# Patient Record
Sex: FEMALE | Race: WHITE | NOT HISPANIC OR LATINO | URBAN - METROPOLITAN AREA
[De-identification: names, ages, dates, MRNs, and addresses within clinical notes are randomized per-mention and may not be internally consistent; named-entity substitution may affect disease eponyms.]

---

## 2017-07-09 ENCOUNTER — EMERGENCY (EMERGENCY)
Facility: HOSPITAL | Age: 37
LOS: 0 days | Discharge: HOME | End: 2017-07-09

## 2017-07-09 DIAGNOSIS — M79.642 PAIN IN LEFT HAND: ICD-10-CM

## 2017-07-09 DIAGNOSIS — I10 ESSENTIAL (PRIMARY) HYPERTENSION: ICD-10-CM
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
## 2017-08-22 ENCOUNTER — EMERGENCY (EMERGENCY)
Facility: HOSPITAL | Age: 37
LOS: 0 days | Discharge: HOME | End: 2017-08-23

## 2017-08-22 DIAGNOSIS — R20.2 PARESTHESIA OF SKIN: ICD-10-CM

## 2017-08-22 DIAGNOSIS — I10 ESSENTIAL (PRIMARY) HYPERTENSION: ICD-10-CM

## 2017-08-22 DIAGNOSIS — Z79.899 OTHER LONG TERM (CURRENT) DRUG THERAPY: ICD-10-CM

## 2019-02-01 ENCOUNTER — EMERGENCY (EMERGENCY)
Facility: HOSPITAL | Age: 39
LOS: 0 days | Discharge: HOME | End: 2019-02-01
Attending: EMERGENCY MEDICINE | Admitting: EMERGENCY MEDICINE

## 2019-02-01 VITALS — WEIGHT: 190.04 LBS | HEIGHT: 64 IN

## 2019-02-01 VITALS
SYSTOLIC BLOOD PRESSURE: 170 MMHG | TEMPERATURE: 95 F | DIASTOLIC BLOOD PRESSURE: 77 MMHG | OXYGEN SATURATION: 98 % | HEART RATE: 107 BPM | RESPIRATION RATE: 18 BRPM

## 2019-02-01 DIAGNOSIS — Z3A.09 9 WEEKS GESTATION OF PREGNANCY: ICD-10-CM

## 2019-02-01 DIAGNOSIS — O21.9 VOMITING OF PREGNANCY, UNSPECIFIED: ICD-10-CM

## 2019-02-01 DIAGNOSIS — R19.7 DIARRHEA, UNSPECIFIED: ICD-10-CM

## 2019-02-01 LAB
ALBUMIN SERPL ELPH-MCNC: 4.3 G/DL — SIGNIFICANT CHANGE UP (ref 3.5–5.2)
ALP SERPL-CCNC: 64 U/L — SIGNIFICANT CHANGE UP (ref 30–115)
ALT FLD-CCNC: 21 U/L — SIGNIFICANT CHANGE UP (ref 0–41)
ANION GAP SERPL CALC-SCNC: 18 MMOL/L — HIGH (ref 7–14)
APPEARANCE UR: ABNORMAL
AST SERPL-CCNC: 24 U/L — SIGNIFICANT CHANGE UP (ref 0–41)
BASOPHILS # BLD AUTO: 0.02 K/UL — SIGNIFICANT CHANGE UP (ref 0–0.2)
BASOPHILS NFR BLD AUTO: 0.2 % — SIGNIFICANT CHANGE UP (ref 0–1)
BILIRUB SERPL-MCNC: 0.5 MG/DL — SIGNIFICANT CHANGE UP (ref 0.2–1.2)
BILIRUB UR-MCNC: NEGATIVE — SIGNIFICANT CHANGE UP
BUN SERPL-MCNC: 10 MG/DL — SIGNIFICANT CHANGE UP (ref 10–20)
CALCIUM SERPL-MCNC: 8.9 MG/DL — SIGNIFICANT CHANGE UP (ref 8.5–10.1)
CHLORIDE SERPL-SCNC: 97 MMOL/L — LOW (ref 98–110)
CO2 SERPL-SCNC: 21 MMOL/L — SIGNIFICANT CHANGE UP (ref 17–32)
COLOR SPEC: YELLOW — SIGNIFICANT CHANGE UP
CREAT SERPL-MCNC: 0.5 MG/DL — LOW (ref 0.7–1.5)
DIFF PNL FLD: NEGATIVE — SIGNIFICANT CHANGE UP
EOSINOPHIL # BLD AUTO: 0.02 K/UL — SIGNIFICANT CHANGE UP (ref 0–0.7)
EOSINOPHIL NFR BLD AUTO: 0.2 % — SIGNIFICANT CHANGE UP (ref 0–8)
GLUCOSE SERPL-MCNC: 108 MG/DL — HIGH (ref 70–99)
GLUCOSE UR QL: NEGATIVE — SIGNIFICANT CHANGE UP
HCG SERPL-ACNC: HIGH MIU/ML
HCT VFR BLD CALC: 42.2 % — SIGNIFICANT CHANGE UP (ref 37–47)
HGB BLD-MCNC: 13.8 G/DL — SIGNIFICANT CHANGE UP (ref 12–16)
IMM GRANULOCYTES NFR BLD AUTO: 0.6 % — HIGH (ref 0.1–0.3)
KETONES UR-MCNC: NEGATIVE — SIGNIFICANT CHANGE UP
LACTATE SERPL-SCNC: 1.2 MMOL/L — SIGNIFICANT CHANGE UP (ref 0.5–2.2)
LEUKOCYTE ESTERASE UR-ACNC: ABNORMAL
LIDOCAIN IGE QN: 96 U/L — HIGH (ref 7–60)
LYMPHOCYTES # BLD AUTO: 0.42 K/UL — LOW (ref 1.2–3.4)
LYMPHOCYTES # BLD AUTO: 3.2 % — LOW (ref 20.5–51.1)
MCHC RBC-ENTMCNC: 27.1 PG — SIGNIFICANT CHANGE UP (ref 27–31)
MCHC RBC-ENTMCNC: 32.7 G/DL — SIGNIFICANT CHANGE UP (ref 32–37)
MCV RBC AUTO: 82.9 FL — SIGNIFICANT CHANGE UP (ref 81–99)
MONOCYTES # BLD AUTO: 0.48 K/UL — SIGNIFICANT CHANGE UP (ref 0.1–0.6)
MONOCYTES NFR BLD AUTO: 3.7 % — SIGNIFICANT CHANGE UP (ref 1.7–9.3)
NEUTROPHILS # BLD AUTO: 12.01 K/UL — HIGH (ref 1.4–6.5)
NEUTROPHILS NFR BLD AUTO: 92.1 % — HIGH (ref 42.2–75.2)
NITRITE UR-MCNC: NEGATIVE — SIGNIFICANT CHANGE UP
PH UR: 6 — SIGNIFICANT CHANGE UP (ref 5–8)
PLATELET # BLD AUTO: 232 K/UL — SIGNIFICANT CHANGE UP (ref 130–400)
POTASSIUM SERPL-MCNC: 4.6 MMOL/L — SIGNIFICANT CHANGE UP (ref 3.5–5)
POTASSIUM SERPL-SCNC: 4.6 MMOL/L — SIGNIFICANT CHANGE UP (ref 3.5–5)
PROT SERPL-MCNC: 7.3 G/DL — SIGNIFICANT CHANGE UP (ref 6–8)
PROT UR-MCNC: 30
RBC # BLD: 5.09 M/UL — SIGNIFICANT CHANGE UP (ref 4.2–5.4)
RBC # FLD: 13.7 % — SIGNIFICANT CHANGE UP (ref 11.5–14.5)
SODIUM SERPL-SCNC: 136 MMOL/L — SIGNIFICANT CHANGE UP (ref 135–146)
SP GR SPEC: >=1.03 — SIGNIFICANT CHANGE UP (ref 1.01–1.03)
UROBILINOGEN FLD QL: 1 (ref 0.2–0.2)
WBC # BLD: 13.03 K/UL — HIGH (ref 4.8–10.8)
WBC # FLD AUTO: 13.03 K/UL — HIGH (ref 4.8–10.8)

## 2019-02-01 RX ORDER — SODIUM CHLORIDE 9 MG/ML
1000 INJECTION, SOLUTION INTRAVENOUS ONCE
Qty: 0 | Refills: 0 | Status: COMPLETED | OUTPATIENT
Start: 2019-02-01 | End: 2019-02-01

## 2019-02-01 RX ORDER — CEPHALEXIN 500 MG
1 CAPSULE ORAL
Qty: 10 | Refills: 0 | OUTPATIENT
Start: 2019-02-01 | End: 2019-02-05

## 2019-02-01 RX ADMIN — SODIUM CHLORIDE 2000 MILLILITER(S): 9 INJECTION, SOLUTION INTRAVENOUS at 11:53

## 2019-02-01 RX ADMIN — SODIUM CHLORIDE 1000 MILLILITER(S): 9 INJECTION, SOLUTION INTRAVENOUS at 11:49

## 2019-02-01 RX ADMIN — SODIUM CHLORIDE 1000 MILLILITER(S): 9 INJECTION, SOLUTION INTRAVENOUS at 10:20

## 2019-02-01 NOTE — ED PROVIDER NOTE - CARE PLAN
Principal Discharge DX:	Nausea, vomiting, and diarrhea  Secondary Diagnosis:	First trimester pregnancy

## 2019-02-01 NOTE — ED PROVIDER NOTE - MEDICAL DECISION MAKING DETAILS
Sx improved with GI cocktail pt tolerating PO fluids in the ED and feeling much better, wants to go home. Has prompt GYN f/u, advised return for recurrent or worsening sx. Sx improved with GI cocktail. Labs with  mild leukocytosis likely due to vomiting, UA with bacteria and ketones. Lipase 96 but no abd pain so doubt truly represents pancreatitis. pt tolerating PO fluids in the ED and feeling much better, wants to go home. Has prompt GYN f/u, advised return for recurrent or worsening sx. Will treat bacteriuria and dc.

## 2019-02-01 NOTE — ED ADULT NURSE NOTE - NSIMPLEMENTINTERV_GEN_ALL_ED
Implemented All Universal Safety Interventions:  [place] to call system. Call bell, personal items and telephone within reach. Instruct patient to call for assistance. Room bathroom lighting operational. Non-slip footwear when patient is off stretcher. Physically safe environment: no spills, clutter or unnecessary equipment. Stretcher in lowest position, wheels locked, appropriate side rails in place.

## 2019-02-01 NOTE — ED PROVIDER NOTE - ATTENDING CONTRIBUTION TO CARE
40yo woman 9weeks pregnant by dates c/o Nausea, vomiting, diarrhea x 1 day without associated fever, chills, abdominal pain, pelvic pain, vaginal discharge or bleeding. No recent travel, sick contacts or obvious food exposure. No prenatal care yet, first visit coming up in 3 days. VS, exam as noted, pt nontoxic appearing but uncomfortable due to vomiting, however abd soft, NT, and FHR by sono is 185. Labs, IVF, symptomatic therapy for likely viral gastroenteritis, reassess.

## 2019-02-01 NOTE — ED PROVIDER NOTE - NSFOLLOWUPINSTRUCTIONS_ED_ALL_ED_FT
Please follow up with your primary care doctor in 1-2 days, and follow up with you OB as scheduled on Monday as discussed.     Nausea / Vomiting    Nausea is the feeling that you have to vomit. As nausea gets worse, it can lead to vomiting. Vomiting puts you at an increased risk for dehydration. Older adults and people with other diseases or a weak immune system are at higher risk for dehydration. Drink clear fluids in small but frequent amounts as tolerated. Eat bland, easy-to-digest foods in small amounts as tolerated.    SEEK IMMEDIATE MEDICAL CARE IF YOU HAVE ANY OF THE FOLLOWING SYMPTOMS: fever, inability to keep sufficient fluids down, black or bloody vomitus, black or bloody stools, lightheadedness/dizziness, chest pain, severe headache, rash, shortness of breath, cold or clammy skin, confusion, pain with urination, or any signs of dehydration.     Diarrhea    Diarrhea is frequent loose or watery bowel movements that has many causes. Diarrhea can make you feel weak and cause you to become dehydrated. Diarrhea typically lasts 2–3 days, but can last longer if it is a sign of something more serious. Drink clear fluids to prevent dehydration. Eat bland, easy-to-digest foods as tolerated.     SEEK IMMEDIATE MEDICAL CARE IF YOU HAVE ANY OF THE FOLLOWING SYMPTOMS: high fevers, lightheadedness/dizziness, chest pain, black or bloody stools, shortness of breath, severe abdominal or back pain, or any signs of dehydration.

## 2019-02-01 NOTE — ED ADULT NURSE NOTE - OBJECTIVE STATEMENT
pt came to the ER today with c/o nausea and vomiting with diarrhea since last night, pt states she is 9 weeks pregnant.

## 2019-02-01 NOTE — ED PROVIDER NOTE - OBJECTIVE STATEMENT
38 yo F, 9 weeks pregnant, , presents with NVD. Started last night. Patient is a health care worker. >10 episodes of vomiting NBNB and diarrhea, watery. Denies abd pain, vaginal discharge, fevers, chills, pelvic pain, CP, SOB, lightheaded, dizziness.

## 2019-02-01 NOTE — ED PROVIDER NOTE - NS ED ROS FT
Constitutional: See HPI.  Eyes: No visual changes, eye pain or discharge. No Photophobia  ENMT: No neck pain or stiffness. No limited ROM  Cardiac: No SOB or edema. No chest pain with exertion.  Respiratory: No cough or respiratory distress. No hemoptysis.   GI: see hpi  : No dysuria, frequency or burning. No Discharge  MS: No myalgia, muscle weakness, joint pain or back pain.  Neuro: No headache or weakness. No LOC.  Skin: No skin rash.  Except as documented in the HPI, all other systems are negative.

## 2019-02-01 NOTE — ED PROVIDER NOTE - PHYSICAL EXAMINATION
AOx4, Non toxic appearing, NAD, speaking in full sentences.   Skin - warm and dry, no acute rash.   Head - normocephalic, atraumatic.   Eyes - PERRLA/EOMI, conjunctiva and sclera clear.   ENT- MM moist, no nasal discharge.  Pharynx unremarkable.    Neck - supple nt, no meningeal signs.   Heart - RRR s1s2 nl, no rub/murmur.   Lungs- No retractions, BS equal, CTAB.   Abdomen - soft ntnd no r/g.   Extremities- moves all, +equal distal pulses, brisk cap refill, sensation wnl, normal ROM. No LE edema, calves nttp b/l.

## 2019-02-02 LAB
CULTURE RESULTS: SIGNIFICANT CHANGE UP
SPECIMEN SOURCE: SIGNIFICANT CHANGE UP

## 2019-02-27 ENCOUNTER — EMERGENCY (EMERGENCY)
Facility: HOSPITAL | Age: 39
LOS: 0 days | Discharge: HOME | End: 2019-02-27
Attending: EMERGENCY MEDICINE | Admitting: EMERGENCY MEDICINE

## 2019-02-27 VITALS
OXYGEN SATURATION: 98 % | RESPIRATION RATE: 18 BRPM | HEART RATE: 110 BPM | TEMPERATURE: 96 F | SYSTOLIC BLOOD PRESSURE: 168 MMHG | DIASTOLIC BLOOD PRESSURE: 85 MMHG

## 2019-02-27 VITALS
OXYGEN SATURATION: 99 % | HEART RATE: 89 BPM | RESPIRATION RATE: 18 BRPM | DIASTOLIC BLOOD PRESSURE: 68 MMHG | SYSTOLIC BLOOD PRESSURE: 142 MMHG

## 2019-02-27 DIAGNOSIS — Z3A.12 12 WEEKS GESTATION OF PREGNANCY: ICD-10-CM

## 2019-02-27 DIAGNOSIS — R10.2 PELVIC AND PERINEAL PAIN: ICD-10-CM

## 2019-02-27 DIAGNOSIS — O20.8 OTHER HEMORRHAGE IN EARLY PREGNANCY: ICD-10-CM

## 2019-02-27 DIAGNOSIS — Z79.2 LONG TERM (CURRENT) USE OF ANTIBIOTICS: ICD-10-CM

## 2019-02-27 DIAGNOSIS — O20.0 THREATENED ABORTION: ICD-10-CM

## 2019-02-27 DIAGNOSIS — O09.511 SUPERVISION OF ELDERLY PRIMIGRAVIDA, FIRST TRIMESTER: ICD-10-CM

## 2019-02-27 LAB
ALBUMIN SERPL ELPH-MCNC: 4 G/DL — SIGNIFICANT CHANGE UP (ref 3.5–5.2)
ALP SERPL-CCNC: 61 U/L — SIGNIFICANT CHANGE UP (ref 30–115)
ALT FLD-CCNC: 13 U/L — SIGNIFICANT CHANGE UP (ref 0–41)
ANION GAP SERPL CALC-SCNC: 13 MMOL/L — SIGNIFICANT CHANGE UP (ref 7–14)
APPEARANCE UR: CLEAR — SIGNIFICANT CHANGE UP
AST SERPL-CCNC: 15 U/L — SIGNIFICANT CHANGE UP (ref 0–41)
BACTERIA # UR AUTO: ABNORMAL /HPF
BILIRUB SERPL-MCNC: <0.2 MG/DL — SIGNIFICANT CHANGE UP (ref 0.2–1.2)
BILIRUB UR-MCNC: NEGATIVE — SIGNIFICANT CHANGE UP
BLD GP AB SCN SERPL QL: SIGNIFICANT CHANGE UP
BUN SERPL-MCNC: 12 MG/DL — SIGNIFICANT CHANGE UP (ref 10–20)
CALCIUM SERPL-MCNC: 9.8 MG/DL — SIGNIFICANT CHANGE UP (ref 8.5–10.1)
CHLORIDE SERPL-SCNC: 104 MMOL/L — SIGNIFICANT CHANGE UP (ref 98–110)
CO2 SERPL-SCNC: 21 MMOL/L — SIGNIFICANT CHANGE UP (ref 17–32)
COLOR SPEC: YELLOW — SIGNIFICANT CHANGE UP
CREAT SERPL-MCNC: 0.5 MG/DL — LOW (ref 0.7–1.5)
DIFF PNL FLD: ABNORMAL
GLUCOSE SERPL-MCNC: 95 MG/DL — SIGNIFICANT CHANGE UP (ref 70–99)
GLUCOSE UR QL: NEGATIVE MG/DL — SIGNIFICANT CHANGE UP
HCG SERPL-ACNC: HIGH MIU/ML
HCT VFR BLD CALC: 39.1 % — SIGNIFICANT CHANGE UP (ref 37–47)
HGB BLD-MCNC: 13 G/DL — SIGNIFICANT CHANGE UP (ref 12–16)
KETONES UR-MCNC: NEGATIVE — SIGNIFICANT CHANGE UP
LEUKOCYTE ESTERASE UR-ACNC: NEGATIVE — SIGNIFICANT CHANGE UP
MCHC RBC-ENTMCNC: 28.1 PG — SIGNIFICANT CHANGE UP (ref 27–31)
MCHC RBC-ENTMCNC: 33.2 G/DL — SIGNIFICANT CHANGE UP (ref 32–37)
MCV RBC AUTO: 84.4 FL — SIGNIFICANT CHANGE UP (ref 81–99)
NITRITE UR-MCNC: NEGATIVE — SIGNIFICANT CHANGE UP
NRBC # BLD: 0 /100 WBCS — SIGNIFICANT CHANGE UP (ref 0–0)
PH UR: 6 — SIGNIFICANT CHANGE UP (ref 5–8)
PLATELET # BLD AUTO: 259 K/UL — SIGNIFICANT CHANGE UP (ref 130–400)
POTASSIUM SERPL-MCNC: 4.2 MMOL/L — SIGNIFICANT CHANGE UP (ref 3.5–5)
POTASSIUM SERPL-SCNC: 4.2 MMOL/L — SIGNIFICANT CHANGE UP (ref 3.5–5)
PROT SERPL-MCNC: 7 G/DL — SIGNIFICANT CHANGE UP (ref 6–8)
PROT UR-MCNC: NEGATIVE MG/DL — SIGNIFICANT CHANGE UP
RBC # BLD: 4.63 M/UL — SIGNIFICANT CHANGE UP (ref 4.2–5.4)
RBC # FLD: 13.5 % — SIGNIFICANT CHANGE UP (ref 11.5–14.5)
SODIUM SERPL-SCNC: 138 MMOL/L — SIGNIFICANT CHANGE UP (ref 135–146)
SP GR SPEC: 1.02 — SIGNIFICANT CHANGE UP (ref 1.01–1.03)
TYPE + AB SCN PNL BLD: SIGNIFICANT CHANGE UP
UROBILINOGEN FLD QL: 0.2 MG/DL — SIGNIFICANT CHANGE UP (ref 0.2–0.2)
WBC # BLD: 11.12 K/UL — HIGH (ref 4.8–10.8)
WBC # FLD AUTO: 11.12 K/UL — HIGH (ref 4.8–10.8)
WBC UR QL: SIGNIFICANT CHANGE UP /HPF

## 2019-02-27 NOTE — ED PROVIDER NOTE - PLAN OF CARE
39f w pregnant w vaginal bleeding. nontoxic appearing, n/v intact. no abd tender. --CBC, CMP, UA, US pelvis, T&S, observe/re-assess, Gyn as needed 39f w pregnant w vaginal spotting. nontoxic appearing, n/v intact. no abd tender. --CBC, CMP, UA, US pelvis, T&S, observe/re-assess, Gyn as needed

## 2019-02-27 NOTE — ED PROVIDER NOTE - PHYSICAL EXAMINATION
Physical Exam  General: Awake, alert, NAD, WDWN, non-toxic appearing, NCAT  Eyes: PERRL, EOMI, no icterus, lids and conjunctivae are normal  ENT: External inspection normal, pink/moist membranes, pharynx normal  CV: S1S2, regular rate and rhythm, no murmur/gallops/rubs, no JVD, 2+ pulses b/l, no edema/cords/homans, warm/well-perfused  Respiratory: Normal respiratory rate/effort, no respiratory distress, normal voice, speaking full sentences, lungs clear to auscultation b/l, no wheezing/rales/rhonchi, no retractions, no stridor  Abdomen: Soft abdomen, no tender/distended/guarding/rebound, no CVA tender  Musculoskeletal: FROM all 4 extremities, N/V intact  Neck: FROM neck, supple, no meningismus, trachea midline, no JVD  Integumentary: Color normal for race, warm and dry, no rash  Neuro: Oriented x3, CN 2-12 grossly intact, normal motor, normal sensory  Psych: Oriented x3, mood normal, affect normal

## 2019-02-27 NOTE — ED PROVIDER NOTE - NSFOLLOWUPCLINICS_GEN_ALL_ED_FT
An OB/GYN physician  Obstetrics & Gynecology  .  [place]   Phone:   Fax:   Follow Up Time: 1-3 Days    [facility]  OB/GYN  [address] [place] [address]  Phone: [phone]  Fax:   Follow Up Time: 1-3 Days

## 2019-02-27 NOTE — ED ADULT NURSE NOTE - OBJECTIVE STATEMENT
patient reports to the ED with a complaint of light vaginal spotting that began 4 hours prior to arrival. patient states she is 12 weeks pregnant and has a history of a prior miscarriage

## 2019-02-27 NOTE — ED PROVIDER NOTE - CLINICAL SUMMARY MEDICAL DECISION MAKING FREE TEXT BOX
39f w pregnant w vaginal spotting. nontoxic appearing, n/v intact. no abd tender. Labs & imaging reviewed. Live IUP noted. Hb & Type ok. Pt advised regarding symptomatic/supportive care, importance of OB f/u, and symptoms to prompt ED return. Copy of results given to patient.

## 2019-02-27 NOTE — ED PROVIDER NOTE - CARE PLAN
Principal Discharge DX:	Vaginal bleeding during pregnancy  Secondary Diagnosis:	Threatened miscarriage Principal Discharge DX:	Vaginal bleeding during pregnancy  Assessment and plan of treatment:	39f w pregnant w vaginal bleeding. nontoxic appearing, n/v intact. no abd tender. --CBC, CMP, UA, US pelvis, T&S, observe/re-assess, Gyn as needed  Secondary Diagnosis:	Threatened miscarriage Principal Discharge DX:	Vaginal bleeding during pregnancy  Assessment and plan of treatment:	39f w pregnant w vaginal spotting. nontoxic appearing, n/v intact. no abd tender. --CBC, CMP, UA, US pelvis, T&S, observe/re-assess, Gyn as needed  Secondary Diagnosis:	Threatened miscarriage

## 2019-02-27 NOTE — ED PROVIDER NOTE - NSFOLLOWUPINSTRUCTIONS_ED_ALL_ED_FT
Threatened Miscarriage  A threatened miscarriage occurs when a woman has vaginal bleeding during the first 20 weeks of pregnancy but the pregnancy has not ended. If you have vaginal bleeding during this time, your health care provider will do tests to make sure you are still pregnant. If the tests show that you are still pregnant and that the developing baby (fetus) inside your uterus is still growing, your condition is considered a threatened miscarriage.    ImageA threatened miscarriage does not mean your pregnancy will end, but it does increase the risk of losing your pregnancy (complete miscarriage).    What are the causes?  The cause of this condition is usually not known. For women who go on to have a complete miscarriage, the most common cause is an abnormal number of chromosomes in the developing baby. Chromosomes are the structures inside cells that hold all of a person's genetic material.    What increases the risk?  The following lifestyle factors may increase your risk of a miscarriage in early pregnancy:    Smoking.  Drinking excessive amounts of alcohol or caffeine.  Recreational drug use.    The following preexisting health conditions may increase your risk of a miscarriage in early pregnancy:    Polycystic ovary syndrome.  Uterine fibroids.  Infections.  Diabetes mellitus.    What are the signs or symptoms?  Symptoms of this condition include:    Vaginal bleeding.  Mild abdominal pain or cramps.    How is this diagnosed?  If you have bleeding with or without abdominal pain before 20 weeks of pregnancy, your health care provider will do tests to check whether you are still pregnant. These will include:    Ultrasound. This test uses sound waves to create images of the inside of your uterus. This allows your health care provider to look at your developing baby and other structures, such as your placenta.  Pelvic exam. This is an internal exam of your vagina and cervix.  Measurement of your baby's heart rate.  Laboratory tests such as blood tests, urine tests, or swabs for infection    You may be diagnosed with a threatened miscarriage if:    Ultrasound testing shows that you are still pregnant.  Your baby’s heart rate is strong.  A pelvic exam shows that the opening between your uterus and your vagina (cervix) is closed.  Blood tests confirm that you are still pregnant.    How is this treated?  No treatments have been shown to prevent a threatened miscarriage from going on to a complete miscarriage. However, the right home care is important.    Follow these instructions at home:  Get plenty of rest.  Do not have sex or use tampons if you have vaginal bleeding.  Do not douche.  Do not smoke or use recreational drugs.  Do not drink alcohol.  Avoid caffeine.  Keep all follow-up prenatal visits as told by your health care provider. This is important.  Contact a health care provider if:  You have light vaginal bleeding or spotting while pregnant.  You have abdominal pain or cramping.  You have a fever.  Get help right away if:  You have heavy vaginal bleeding.  You have blood clots coming from your vagina.  You pass tissue from your vagina.  You leak fluid, or you have a gush of fluid from your vagina.  You have severe low back pain or abdominal cramps.  You have fever, chills, and severe abdominal pain.  Summary  A threatened miscarriage occurs when a woman has vaginal bleeding during the first 20 weeks of pregnancy but the pregnancy has not ended.  The cause of a threatened miscarriage is usually not known.  Symptoms of this condition may include vaginal bleeding and mild abdominal pain or cramps.  No treatments have been shown to prevent a threatened miscarriage from going on to a complete miscarriage.  Keep all follow-up prenatal visits as told by your health care provider. This is important.  This information is not intended to replace advice given to you by your health care provider. Make sure you discuss any questions you have with your health care provider.

## 2019-02-27 NOTE — ED ADULT NURSE NOTE - NSIMPLEMENTINTERV_GEN_ALL_ED
Implemented All Universal Safety Interventions:  [place] to call system. Call bell, personal items and telephone within reach. Instruct patient to call for assistance. Room bathroom lighting operational. Non-slip footwear when patient is off stretcher. Physically safe environment: no spills, clutter or unnecessary equipment. Stretcher in lowest position, wheels locked, appropriate side rails in place.

## 2019-02-27 NOTE — ED PROVIDER NOTE - OBJECTIVE STATEMENT
39f w no PMH,  @~12wks w prior US showing IUP. Pt presents w vaginal spotting today. Symptoms are mild, no exacerbating/alleviating. Pt also reporting mild pelvic cramping, intermittent, no radiating, no exacerbating/alleviating.

## 2019-03-01 LAB
CULTURE RESULTS: SIGNIFICANT CHANGE UP
SPECIMEN SOURCE: SIGNIFICANT CHANGE UP

## 2019-04-27 ENCOUNTER — OUTPATIENT (OUTPATIENT)
Dept: OUTPATIENT SERVICES | Facility: HOSPITAL | Age: 39
LOS: 1 days | Discharge: HOME | End: 2019-04-27

## 2019-04-27 VITALS — DIASTOLIC BLOOD PRESSURE: 67 MMHG | HEART RATE: 77 BPM | SYSTOLIC BLOOD PRESSURE: 146 MMHG

## 2019-04-27 VITALS — TEMPERATURE: 98 F

## 2019-04-27 DIAGNOSIS — Z91.19 PATIENT'S NONCOMPLIANCE WITH OTHER MEDICAL TREATMENT AND REGIMEN: ICD-10-CM

## 2019-04-27 DIAGNOSIS — O26.892 OTHER SPECIFIED PREGNANCY RELATED CONDITIONS, SECOND TRIMESTER: ICD-10-CM

## 2019-04-27 DIAGNOSIS — Z90.49 ACQUIRED ABSENCE OF OTHER SPECIFIED PARTS OF DIGESTIVE TRACT: Chronic | ICD-10-CM

## 2019-04-27 NOTE — OB PROVIDER TRIAGE NOTE - NS_OBGYNHISTORY_OBGYN_ALL_OB_FT
OB Hx: 19w vaginal delivery s/p ROM  GYN Hx: denies h/o fibroids, ovarian cysts, abnormal paps and STIs OB Hx: 19w vaginal delivery s/p PPROM  GYN Hx: denies h/o fibroids, ovarian cysts, abnormal paps and STIs

## 2019-04-27 NOTE — OB PROVIDER TRIAGE NOTE - HISTORY OF PRESENT ILLNESS
38 yo  at 21w w/ ANDREW of 2019 by LMP consistent with 1st trimester sonogram here because she saw some fluid stain on her underwear at around 1515 after being stuck in traffic for a while and rushing to the bathroom to urinate. First time this happened so she was worried, also has h/o ROM at 19w in her previous pregnancy. She cannot tell if it had a smell and cannot tell if it was clear or not. The underwear was a little wet, not leaking anymore. Denies contractions and VB. Not yet feeling the baby move. Last BM was in the AM, last PO intake was at around 1300 and last intercourse was 4 months ago. Denies fever/chills, N/V, diarrhea/constipation, abnormal vaginal discharge, dysuria, hematuria, frequency, new onset urgency, sore throat, cough, runny nose, palpitations, SOB, chest pain, headache, sick contacts and recent travel. No complications in this pregnancy. Last PMD visit was 2 days ago, but VE was not done. Being followed by Tobi.     Meds: none  Allergies: NKDA  SH: denies tobacco, alcohol and illicit drug use 40 yo  at 21w w/ ANDREW of 2019 by LMP consistent with 1st trimester sonogram here because she saw some fluid stain on her underwear at around 1515 after being stuck in traffic for a while and rushing to the bathroom to urinate. First time this happened so she was worried, also has h/o ROM at 19w in her previous pregnancy. She cannot tell if it had a smell and cannot tell if it was clear or not. The underwear was a little wet, not leaking anymore. Denies contractions and VB. Not yet feeling the baby move. Last BM was in the AM, last PO intake was at around 1300 and last intercourse was 4 months ago. Denies fever/chills, N/V, diarrhea/constipation, abnormal vaginal discharge, dysuria, hematuria, frequency, new onset urgency, sore throat, cough, runny nose, palpitations, SOB, chest pain, headache, RUQ/epigastric pain, sick contacts and recent travel. No complications in this pregnancy. Had an elevated BP in triage of 148/65, reports always getting BPs in the elevated range in the doctor's office and has been keeping track of her BPs at home since the beginning of April and all of her BPs have been normal since. Last PMD visit was 2 days ago, but VE was not done. Being followed by Tobi.     Meds: none  Allergies: NKDA  SH: denies tobacco, alcohol and illicit drug use 40 yo  at 21w0d w/ ANDREW of 2019 by LMP consistent with 1st trimester sonogram here because she saw some fluid stain on her underwear at around 1515 after being stuck in traffic for a while and rushing to the bathroom to urinate. First time this has happened.  She cannot tell if it had a smell and cannot tell if it was clear or not. No further leaking. Pt has h/o PPROM at 19wks followed by [facility].  Denies contractions and VB. Not yet feeling the baby move. Last BM was in the AM, last PO intake was at around 1300 and last intercourse was 4 months ago. Denies fever/chills, N/V, diarrhea/constipation, abnormal vaginal discharge, dysuria, hematuria, frequency, new onset urgency, sore throat, cough, runny nose, palpitations, SOB, chest pain, headache, RUQ/epigastric pain, sick contacts and recent travel. No complications in this pregnancy. Had an elevated BP in triage of 148/65, reports always getting BPs in the elevated range in the doctor's office and has been keeping track of her BPs at home since the beginning of April and all of her BPs have been normal since. Last PMD visit was 2 days ago, but VE was not done. Being followed by Tobi.     Meds: none  Allergies: NKDA  SH: denies tobacco, alcohol and illicit drug use

## 2019-04-27 NOTE — OB PROVIDER TRIAGE NOTE - NSOBPROVIDERNOTE_OBGYN_ALL_OB_FT
38 yo  at 21w, GBS unknown, r/o ROM, not ruptured, w/ elevated BPs, no PEL symptoms, doing well,     -Discharge home  - labor precautions  -F/u with PMD as scheduled  -Preeclampsia precautions     Dr. Villafuerte and Dr. Garcia to be made aware. 40 yo  at 21w, GBS unknown, r/o PPROM, not ruptured, w/ elevated BPs, no PEL symptoms, h/o white coat hypertension, BP log nl, doing well,     -Discharge home  - labor precautions  -F/u with PMD as scheduled  -Preeclampsia precautions     Dr. Villafuerte and Dr. Garcia to be made aware. 38 yo  at 21w, GBS unknown, r/o PPROM, not ruptured, w/ elevated BPs, no PEL symptoms, h/o white coat hypertension per pt, BP log nl, doing well,     -PEL labs  -Monitor BPs q15min    Dr. Villafuerte and Dr. Garcia aware.    ADDENDUM:  Pt said she does not want to be monitored for PEL, also refused getting PEL labs done, she said she has an appointment with her PMD in 10 days and that they are monitoring it carefully. We explained R/B/A, pt still expressed wanting to leave.  labor and preeclampsia precautions given. Signed out AMA.    Dr. Villafuerte and Dr. Garcia aware. 40 yo  at 21w, GBS unknown, r/o PPROM, not ruptured, w/ elevated BPs, no PEL symptoms, h/o white coat hypertension per pt, BP log nl, doing well,     -PEL labs  -Monitor BPs q15min    Dr. Villafuerte and Dr. Garcia aware.    ADDENDUM:    Pt said she does not want to be monitored for PEL, also refused getting PEL labs done, she said she has an appointment with her PMD in 10 days and that they are monitoring it carefully. We explained R/B/A, pt still expressed wanting to leave.  labor and preeclampsia precautions given. Signed out AMA.    Dr. Villafuerte and Dr. Garcia aware. 38 yo  at 21w, GBS unknown, r/o PPROM, not ruptured, w/ elevated BPs, no PEL symptoms, h/o white coat hypertension per pt, BP log nl, doing well,     -PEL labs  -Monitor BPs q15min    Dr. Villafuerte and Dr. Garcia aware.    ADDENDUM:    Recommendation to patient is that she stay for prologned BP monitoring and preecalmptic labwork.  Pt said she does not want to be monitored and refused to have labwork drawn.  She said she has an appointment with her PMD in 10 days and that they are monitoring her BPs closely. I explained to pt the risks including but not limited to severely elevated blood pressures, stroke, and seizure along with fetal complications including fetal growth restriction,  delivery, placental abruption.  Also discussed with patient the risk of maternal and fetal death.  Pt still expressed wanting to leave.  labor and preeclampsia precautions given.  Stressed importance of follow up with her PMD.  Signed out AMA.    Dr. Villafuerte and Dr. Garcia aware.

## 2019-04-27 NOTE — OB RN TRIAGE NOTE - NS_TRIAGEADDITIONAL COMMENTS_OBGYN_ALL_OB_FT
At 1955, Pt. left AMA. AMA papers signed with Dr. Milton.  Pt. verbalized understanding of risks of leaving AMA.

## 2019-04-27 NOTE — OB PROVIDER TRIAGE NOTE - NSHPPHYSICALEXAM_GEN_ALL_CORE
Vital Signs Last 24 Hrs  T(C): 37 (27 Apr 2019 18:08), Max: 37 (27 Apr 2019 18:08)  T(F): 98.6 (27 Apr 2019 18:08), Max: 98.6 (27 Apr 2019 18:08)  HR: 90 (27 Apr 2019 18:15) (90 - 99)  BP: 148/65 (27 Apr 2019 18:15) (137/85 - 148/65)  RR: 16 (27 Apr 2019 18:08) (16 - 16)    Udip: not done Vital Signs Last 24 Hrs  T(C): 37 (27 Apr 2019 18:08), Max: 37 (27 Apr 2019 18:08)  T(F): 98.6 (27 Apr 2019 18:08), Max: 98.6 (27 Apr 2019 18:08)  HR: 90 (27 Apr 2019 18:15) (90 - 99)  BP: 148/65 (27 Apr 2019 18:15) (137/85 - 148/65)  RR: 16 (27 Apr 2019 18:08) (16 - 16)    Udip: not done  Speculum: very mild physiologic discharge, no bleeding, no pooling, nitrazine and ferning neg  Bedside sono: MVP 6.31 cm,  bpm, posterior placenta, cephalic, CL 4.52 cm   Abd: NT, gravid, no palpable contractions, no RUQ/epigastric tenderness Vital Signs Last 24 Hrs  T(C): 37 (27 Apr 2019 18:08), Max: 37 (27 Apr 2019 18:08)  T(F): 98.6 (27 Apr 2019 18:08), Max: 98.6 (27 Apr 2019 18:08)  HR: 90 (27 Apr 2019 18:15) (90 - 99)  BP: 148/65 (27 Apr 2019 18:15) (137/85 - 148/65)  RR: 16 (27 Apr 2019 18:08) (16 - 16)    Udip: not done  Speculum: very mild physiologic discharge, no bleeding, no pooling, nitrazine and ferning neg  Bedside sono: MVP 6.31 cm,  bpm, posterior placenta, cephalic, CL 4.52 cm   Abd: NT, gravid, no palpable contractions, no RUQ/epigastric tenderness  Digital exam: pt refused

## 2019-08-31 ENCOUNTER — EMERGENCY (EMERGENCY)
Facility: HOSPITAL | Age: 39
LOS: 0 days | Discharge: HOME | End: 2019-08-31
Attending: EMERGENCY MEDICINE | Admitting: EMERGENCY MEDICINE
Payer: COMMERCIAL

## 2019-08-31 VITALS
SYSTOLIC BLOOD PRESSURE: 144 MMHG | RESPIRATION RATE: 17 BRPM | DIASTOLIC BLOOD PRESSURE: 97 MMHG | OXYGEN SATURATION: 96 % | WEIGHT: 259.93 LBS | TEMPERATURE: 99 F | HEART RATE: 87 BPM | HEIGHT: 64 IN

## 2019-08-31 DIAGNOSIS — R21 RASH AND OTHER NONSPECIFIC SKIN ERUPTION: ICD-10-CM

## 2019-08-31 DIAGNOSIS — O99.89 OTHER SPECIFIED DISEASES AND CONDITIONS COMPLICATING PREGNANCY, CHILDBIRTH AND THE PUERPERIUM: ICD-10-CM

## 2019-08-31 DIAGNOSIS — L29.9 PRURITUS, UNSPECIFIED: ICD-10-CM

## 2019-08-31 DIAGNOSIS — Z90.49 ACQUIRED ABSENCE OF OTHER SPECIFIED PARTS OF DIGESTIVE TRACT: Chronic | ICD-10-CM

## 2019-08-31 DIAGNOSIS — Z87.59 PERSONAL HISTORY OF OTHER COMPLICATIONS OF PREGNANCY, CHILDBIRTH AND THE PUERPERIUM: ICD-10-CM

## 2019-08-31 LAB
ALBUMIN SERPL ELPH-MCNC: 3.9 G/DL — SIGNIFICANT CHANGE UP (ref 3.5–5.2)
ALP SERPL-CCNC: 84 U/L — SIGNIFICANT CHANGE UP (ref 30–115)
ALT FLD-CCNC: 22 U/L — SIGNIFICANT CHANGE UP (ref 0–41)
ANION GAP SERPL CALC-SCNC: 14 MMOL/L — SIGNIFICANT CHANGE UP (ref 7–14)
AST SERPL-CCNC: 18 U/L — SIGNIFICANT CHANGE UP (ref 0–41)
BASOPHILS # BLD AUTO: 0.04 K/UL — SIGNIFICANT CHANGE UP (ref 0–0.2)
BASOPHILS NFR BLD AUTO: 0.6 % — SIGNIFICANT CHANGE UP (ref 0–1)
BILIRUB DIRECT SERPL-MCNC: <0.2 MG/DL — SIGNIFICANT CHANGE UP (ref 0–0.2)
BILIRUB INDIRECT FLD-MCNC: SIGNIFICANT CHANGE UP MG/DL (ref 0.2–1.2)
BILIRUB SERPL-MCNC: <0.2 MG/DL — SIGNIFICANT CHANGE UP (ref 0.2–1.2)
BUN SERPL-MCNC: 16 MG/DL — SIGNIFICANT CHANGE UP (ref 10–20)
CALCIUM SERPL-MCNC: 9.5 MG/DL — SIGNIFICANT CHANGE UP (ref 8.5–10.1)
CHLORIDE SERPL-SCNC: 103 MMOL/L — SIGNIFICANT CHANGE UP (ref 98–110)
CO2 SERPL-SCNC: 23 MMOL/L — SIGNIFICANT CHANGE UP (ref 17–32)
CREAT SERPL-MCNC: 0.7 MG/DL — SIGNIFICANT CHANGE UP (ref 0.7–1.5)
EOSINOPHIL # BLD AUTO: 0.27 K/UL — SIGNIFICANT CHANGE UP (ref 0–0.7)
EOSINOPHIL NFR BLD AUTO: 3.8 % — SIGNIFICANT CHANGE UP (ref 0–8)
GLUCOSE SERPL-MCNC: 132 MG/DL — HIGH (ref 70–99)
HCT VFR BLD CALC: 37.8 % — SIGNIFICANT CHANGE UP (ref 37–47)
HGB BLD-MCNC: 11.7 G/DL — LOW (ref 12–16)
IMM GRANULOCYTES NFR BLD AUTO: 1.1 % — HIGH (ref 0.1–0.3)
LYMPHOCYTES # BLD AUTO: 1.74 K/UL — SIGNIFICANT CHANGE UP (ref 1.2–3.4)
LYMPHOCYTES # BLD AUTO: 24.6 % — SIGNIFICANT CHANGE UP (ref 20.5–51.1)
MCHC RBC-ENTMCNC: 26.4 PG — LOW (ref 27–31)
MCHC RBC-ENTMCNC: 31 G/DL — LOW (ref 32–37)
MCV RBC AUTO: 85.3 FL — SIGNIFICANT CHANGE UP (ref 81–99)
MONOCYTES # BLD AUTO: 0.51 K/UL — SIGNIFICANT CHANGE UP (ref 0.1–0.6)
MONOCYTES NFR BLD AUTO: 7.2 % — SIGNIFICANT CHANGE UP (ref 1.7–9.3)
NEUTROPHILS # BLD AUTO: 4.42 K/UL — SIGNIFICANT CHANGE UP (ref 1.4–6.5)
NEUTROPHILS NFR BLD AUTO: 62.7 % — SIGNIFICANT CHANGE UP (ref 42.2–75.2)
NRBC # BLD: 0 /100 WBCS — SIGNIFICANT CHANGE UP (ref 0–0)
PLATELET # BLD AUTO: 280 K/UL — SIGNIFICANT CHANGE UP (ref 130–400)
POTASSIUM SERPL-MCNC: 4.2 MMOL/L — SIGNIFICANT CHANGE UP (ref 3.5–5)
POTASSIUM SERPL-SCNC: 4.2 MMOL/L — SIGNIFICANT CHANGE UP (ref 3.5–5)
PROT SERPL-MCNC: 6.7 G/DL — SIGNIFICANT CHANGE UP (ref 6–8)
RBC # BLD: 4.43 M/UL — SIGNIFICANT CHANGE UP (ref 4.2–5.4)
RBC # FLD: 14 % — SIGNIFICANT CHANGE UP (ref 11.5–14.5)
SODIUM SERPL-SCNC: 140 MMOL/L — SIGNIFICANT CHANGE UP (ref 135–146)
WBC # BLD: 7.06 K/UL — SIGNIFICANT CHANGE UP (ref 4.8–10.8)
WBC # FLD AUTO: 7.06 K/UL — SIGNIFICANT CHANGE UP (ref 4.8–10.8)

## 2019-08-31 PROCEDURE — 99284 EMERGENCY DEPT VISIT MOD MDM: CPT

## 2019-08-31 RX ORDER — DIPHENHYDRAMINE HCL 50 MG
50 CAPSULE ORAL ONCE
Refills: 0 | Status: COMPLETED | OUTPATIENT
Start: 2019-08-31 | End: 2019-08-31

## 2019-08-31 NOTE — ED PROVIDER NOTE - NSFOLLOWUPINSTRUCTIONS_ED_ALL_ED_FT
Pruritus  Pruritus is an itchy feeling on the skin. One of the most common causes is dry skin, but many different things can cause itching. Most cases of itching do not require medical attention. Sometimes itchy skin can turn into a rash.    Follow these instructions at home:  Skin care     Image   Apply moisturizing lotion to your skin as needed. Lotion that contains petroleum jelly is best.  Take medicines or apply medicated creams only as told by your health care provider. This may include:  Corticosteroid cream.  Anti-itch lotions.  Oral antihistamines.  Apply a cool, wet cloth (cool compress) to the affected areas.  Take baths with one of the following:  Epsom salts. You can get these at your local pharmacy or grocery store. Follow the instructions on the packaging.  Baking soda. Pour a small amount into the bath as told by your health care provider.  Colloidal oatmeal. You can get this at your local pharmacy or grocery store. Follow the instructions on the packaging.  Apply baking soda paste to your skin. To make the paste, stir water into a small amount of baking soda until it reaches a paste-like consistency.  Do not scratch your skin.  Do not take hot showers or baths, which can make itching worse. A cool shower may help with itching as long as you apply moisturizing lotion after the shower.  Do not use scented soaps, detergents, perfumes, and cosmetic products. Instead, use gentle, unscented versions of these items.  General instructions     Avoid wearing tight clothes.  Keep a journal to help find out what is causing your itching. Write down:  What you eat and drink.  What cosmetic products you use.  What soaps or detergents you use.  What you wear, including jewelry.  Use a humidifier. This keeps the air moist, which helps to prevent dry skin.  Be aware of any changes in your itchiness.  Contact a health care provider if:  The itching does not go away after several days.  You are unusually thirsty or urinating more than normal.  Your skin tingles or feels numb.  Your skin or the white parts of your eyes turn yellow (jaundice).  You feel weak.  You have any of the following:  Night sweats.  Tiredness (fatigue).  Weight loss.  Abdominal pain.  Summary  Pruritus is an itchy feeling on the skin. One of the most common causes is dry skin, but many different conditions and factors can cause itching.  Apply moisturizing lotion to your skin as needed. Lotion that contains petroleum jelly is best.  Take medicines or apply medicated creams only as told by your health care provider.  Do not take hot showers or baths. Do not use scented soaps, detergents, perfumes, or cosmetic products.  This information is not intended to replace advice given to you by your health care provider. Make sure you discuss any questions you have with your health care provider.

## 2019-08-31 NOTE — ED PROVIDER NOTE - CLINICAL SUMMARY MEDICAL DECISION MAKING FREE TEXT BOX
pt seen for rash, labs wnl, pt declined Benadryl. Advised discontinuing ibuprofen use for concern can be drug rash.  Advised very close follow up with her OB and PMD and pt agreed. Strict return precautions advised and pt verbalized understanding.

## 2019-08-31 NOTE — ED PROVIDER NOTE - ATTENDING CONTRIBUTION TO CARE
38 yo female with PMH HTN on labetalol, postpartum day 10 s/p Csx, presents c/o pruritic rash x 2 days. Pt reports diffuse itching. Denies any new meds but has been taking labetalol through pregnancy and ibuprofen daily since Csx. Denies any fever, chills, N/V/D, abdominal pain, headache, or weakness. Pt breast feeding without issue and reports lochia resolved.      VITAL SIGNS: noted  CONSTITUTIONAL: Well-developed; well-nourished; in no acute distress  HEAD: Normocephalic; atraumatic  EYES: PERRL, EOM intact; conjunctiva and sclera clear  ENT: No nasal discharge; airway clear. MMM  NECK: Supple; non tender. No anterior cervical lymphadenopathy noted  CARD: S1, S2 normal; no murmurs, gallops, or rubs. Regular rate and rhythm  RESP: CTAB/L, no wheezes, rales or rhonchi  ABD: Normal bowel sounds; soft; non-distended; non-tender; no hepatosplenomegaly. No CVA tenderness  EXT: Normal ROM. No calf tenderness or edema. Distal pulses intact  NEURO: Alert, oriented. Grossly unremarkable. No focal deficits  SKIN: Skin exam is warm and dry, fine papular rash noted scattered over abdomen and bilateral arms, no vesicular lesions or petechiae, no wheals or signs urticaria, no target lesions, no signs cellulitis

## 2019-08-31 NOTE — ED ADULT TRIAGE NOTE - CHIEF COMPLAINT QUOTE
"I went to Urgent care because I have itching all over. I had a baby last Wednesday the 21st (c section). They said I should come here and have blood work drawn to check my liver function"

## 2019-08-31 NOTE — ED PROVIDER NOTE - PROGRESS NOTE DETAILS
Pt in no distress. Discussed labs. Discussed need to stop ibuprofen. Discussed signs and symptoms to return to the ED for. Pt understands and agrees with discharge plan

## 2019-08-31 NOTE — ED PROVIDER NOTE - GASTROINTESTINAL, MLM
Strong peripheral pulses
Abdomen soft, non-tender, non distended, no rebound, no rigidity, no guarding. C section incision intact clean dry intact

## 2019-08-31 NOTE — ED PROVIDER NOTE - PATIENT PORTAL LINK FT
You can access the FollowMyHealth Patient Portal offered by Health system by registering at the following website: [email or web address]. By joining [facility]’s FollowMyHealth portal, you will also be able to view your health information using other applications (apps) compatible with our system.

## 2019-08-31 NOTE — ED PROVIDER NOTE - SKIN, MLM
Skin normal color for race, warm, dry and intact. Scattered excoriation on the legs, abdomen and arms from pruritis

## 2019-08-31 NOTE — ED PROVIDER NOTE - OBJECTIVE STATEMENT
40 yo F , 10 days postpartum, presents for evaluation of rash, onset 2 days ago, associated with itching all over the body. No fever, no chills, no headache, no n/v/d, no chest pain, no back pain, no abdominal pain, no SOB, no vesicles. Pt states she was seen in the urgent care and was told to come to the ED for blood work. Pt reports that she has been taking ibuprofen with no relief on the rash. Pt reports that her c section is intact, and she has been breast feeding with no difficulty.

## 2020-12-09 NOTE — OB RN TRIAGE NOTE - TEMPERATURE IN CELSIUS (DEGREES C)
Comment: No evidence of corn, wart, or foreign body upon exam today
Detail Level: Simple
Comment: Discussed possibility of filaggrin deficiency
38

## 2023-05-05 NOTE — ED PROVIDER NOTE - MDM ORDERS SUBMITTED SELECTION

## 2023-06-29 ENCOUNTER — EMERGENCY (EMERGENCY)
Facility: HOSPITAL | Age: 43
LOS: 0 days | Discharge: HOME | End: 2023-06-29
Attending: EMERGENCY MEDICINE
Payer: COMMERCIAL

## 2023-06-29 VITALS
HEIGHT: 64 IN | TEMPERATURE: 98 F | OXYGEN SATURATION: 99 % | DIASTOLIC BLOOD PRESSURE: 81 MMHG | WEIGHT: 270.07 LBS | RESPIRATION RATE: 15 BRPM | HEART RATE: 89 BPM | SYSTOLIC BLOOD PRESSURE: 173 MMHG

## 2023-06-29 DIAGNOSIS — Z90.49 ACQUIRED ABSENCE OF OTHER SPECIFIED PARTS OF DIGESTIVE TRACT: Chronic | ICD-10-CM

## 2023-06-29 DIAGNOSIS — Z90.49 ACQUIRED ABSENCE OF OTHER SPECIFIED PARTS OF DIGESTIVE TRACT: ICD-10-CM

## 2023-06-29 DIAGNOSIS — N93.9 ABNORMAL UTERINE AND VAGINAL BLEEDING, UNSPECIFIED: ICD-10-CM

## 2023-06-29 LAB
ALBUMIN SERPL ELPH-MCNC: 4.5 G/DL — SIGNIFICANT CHANGE UP (ref 3.5–5.2)
ALP SERPL-CCNC: 105 U/L — SIGNIFICANT CHANGE UP (ref 30–115)
ALT FLD-CCNC: 13 U/L — SIGNIFICANT CHANGE UP (ref 0–41)
ANION GAP SERPL CALC-SCNC: 15 MMOL/L — HIGH (ref 7–14)
AST SERPL-CCNC: 15 U/L — SIGNIFICANT CHANGE UP (ref 0–41)
BASOPHILS # BLD AUTO: 0.05 K/UL — SIGNIFICANT CHANGE UP (ref 0–0.2)
BASOPHILS NFR BLD AUTO: 0.4 % — SIGNIFICANT CHANGE UP (ref 0–1)
BILIRUB SERPL-MCNC: 0.2 MG/DL — SIGNIFICANT CHANGE UP (ref 0.2–1.2)
BUN SERPL-MCNC: 10 MG/DL — SIGNIFICANT CHANGE UP (ref 10–20)
CALCIUM SERPL-MCNC: 9.6 MG/DL — SIGNIFICANT CHANGE UP (ref 8.4–10.5)
CHLORIDE SERPL-SCNC: 102 MMOL/L — SIGNIFICANT CHANGE UP (ref 98–110)
CO2 SERPL-SCNC: 22 MMOL/L — SIGNIFICANT CHANGE UP (ref 17–32)
CREAT SERPL-MCNC: 0.7 MG/DL — SIGNIFICANT CHANGE UP (ref 0.7–1.5)
EGFR: 111 ML/MIN/1.73M2 — SIGNIFICANT CHANGE UP
EOSINOPHIL # BLD AUTO: 0.2 K/UL — SIGNIFICANT CHANGE UP (ref 0–0.7)
EOSINOPHIL NFR BLD AUTO: 1.7 % — SIGNIFICANT CHANGE UP (ref 0–8)
GLUCOSE SERPL-MCNC: 111 MG/DL — HIGH (ref 70–99)
HCG SERPL QL: NEGATIVE — SIGNIFICANT CHANGE UP
HCT VFR BLD CALC: 40.2 % — SIGNIFICANT CHANGE UP (ref 37–47)
HGB BLD-MCNC: 13 G/DL — SIGNIFICANT CHANGE UP (ref 12–16)
IMM GRANULOCYTES NFR BLD AUTO: 0.3 % — SIGNIFICANT CHANGE UP (ref 0.1–0.3)
LYMPHOCYTES # BLD AUTO: 17 % — LOW (ref 20.5–51.1)
LYMPHOCYTES # BLD AUTO: 2.04 K/UL — SIGNIFICANT CHANGE UP (ref 1.2–3.4)
MCHC RBC-ENTMCNC: 26.6 PG — LOW (ref 27–31)
MCHC RBC-ENTMCNC: 32.3 G/DL — SIGNIFICANT CHANGE UP (ref 32–37)
MCV RBC AUTO: 82.2 FL — SIGNIFICANT CHANGE UP (ref 81–99)
MONOCYTES # BLD AUTO: 0.63 K/UL — HIGH (ref 0.1–0.6)
MONOCYTES NFR BLD AUTO: 5.2 % — SIGNIFICANT CHANGE UP (ref 1.7–9.3)
NEUTROPHILS # BLD AUTO: 9.06 K/UL — HIGH (ref 1.4–6.5)
NEUTROPHILS NFR BLD AUTO: 75.4 % — HIGH (ref 42.2–75.2)
NRBC # BLD: 0 /100 WBCS — SIGNIFICANT CHANGE UP (ref 0–0)
PLATELET # BLD AUTO: 378 K/UL — SIGNIFICANT CHANGE UP (ref 130–400)
PMV BLD: 9.9 FL — SIGNIFICANT CHANGE UP (ref 7.4–10.4)
POTASSIUM SERPL-MCNC: 4.2 MMOL/L — SIGNIFICANT CHANGE UP (ref 3.5–5)
POTASSIUM SERPL-SCNC: 4.2 MMOL/L — SIGNIFICANT CHANGE UP (ref 3.5–5)
PROT SERPL-MCNC: 7.2 G/DL — SIGNIFICANT CHANGE UP (ref 6–8)
RBC # BLD: 4.89 M/UL — SIGNIFICANT CHANGE UP (ref 4.2–5.4)
RBC # FLD: 13.7 % — SIGNIFICANT CHANGE UP (ref 11.5–14.5)
SODIUM SERPL-SCNC: 139 MMOL/L — SIGNIFICANT CHANGE UP (ref 135–146)
WBC # BLD: 12.02 K/UL — HIGH (ref 4.8–10.8)
WBC # FLD AUTO: 12.02 K/UL — HIGH (ref 4.8–10.8)

## 2023-06-29 PROCEDURE — 36415 COLL VENOUS BLD VENIPUNCTURE: CPT

## 2023-06-29 PROCEDURE — 85025 COMPLETE CBC W/AUTO DIFF WBC: CPT

## 2023-06-29 PROCEDURE — 99284 EMERGENCY DEPT VISIT MOD MDM: CPT | Mod: 25

## 2023-06-29 PROCEDURE — 99284 EMERGENCY DEPT VISIT MOD MDM: CPT

## 2023-06-29 PROCEDURE — 76856 US EXAM PELVIC COMPLETE: CPT | Mod: 26

## 2023-06-29 PROCEDURE — 84703 CHORIONIC GONADOTROPIN ASSAY: CPT

## 2023-06-29 PROCEDURE — 76856 US EXAM PELVIC COMPLETE: CPT

## 2023-06-29 PROCEDURE — 80053 COMPREHEN METABOLIC PANEL: CPT

## 2023-06-29 RX ORDER — KETOROLAC TROMETHAMINE 30 MG/ML
30 SYRINGE (ML) INJECTION ONCE
Refills: 0 | Status: DISCONTINUED | OUTPATIENT
Start: 2023-06-29 | End: 2023-06-29

## 2023-06-29 RX ORDER — SODIUM CHLORIDE 9 MG/ML
1000 INJECTION, SOLUTION INTRAVENOUS ONCE
Refills: 0 | Status: COMPLETED | OUTPATIENT
Start: 2023-06-29 | End: 2023-06-29

## 2023-06-29 RX ADMIN — SODIUM CHLORIDE 1000 MILLILITER(S): 9 INJECTION, SOLUTION INTRAVENOUS at 14:56

## 2023-06-29 NOTE — ED PROVIDER NOTE - OBJECTIVE STATEMENT
Patient is a 43-year-old female past medical history of gestational hypertension presenting for evaluation of worsening vaginal bleeding.  Patient states that she usually gets her.'s every month and they are regular.  Last month her period was very light.  This time around she is having worsening vaginal bleeding, stating that she had to change her tampon 2-3 times today before noon.  She denies fevers, chills, abdominal pain, nausea, vomiting, chest pain, shortness of breath, dysuria, hematuria. Patient states she has not been sexually active for years.

## 2023-06-29 NOTE — ED PROVIDER NOTE - PATIENT PORTAL LINK FT
You can access the FollowMyHealth Patient Portal offered by [facility] [place] by registering at the following website: [email or web address]. By joining [facility]’s FollowMyHealth portal, you will also be able to view your health information using other applications (apps) compatible with our system.

## 2023-06-29 NOTE — ED PROVIDER NOTE - NSFOLLOWUPCLINICS_GEN_ALL_ED_FT
[facility]  OB/GYN  [address] [place] [address]  Phone: [phone]  Fax:   Follow Up Time: 1-3 Days

## 2023-06-29 NOTE — ED PROVIDER NOTE - ATTENDING CONTRIBUTION TO CARE
I have reviewed and agree with the mid-level note, except as documented in my note below.    43-year-old female denies significant PMH, PSH significant for cholecystectomy, non-smoker, denies daily EtOH use, menses usually q. 28 days x 5 days, 3 tampons per day, now presents with vaginal bleeding since 6/27, reports multiple tampons required today, denies abdominal pain, n/v/d, chest pain, palpitations, SOB, dizziness, near syncope or syncope, fever, anorexia, respiratory sx, change in bowel habits or urinary sx, vaginal d/c or other associated complaints at present. Old chart reviewed. I have reviewed and agree with the initial nursing note, except as documented in my note.    VSS, awake, alert, no scleral icterus, oropharynx clear, mmm, no JVD or bruit, no jaundice, skin rash or lesions, chest CTAB, non-labored breathing, no w/r/r, +S1/S2, RRR, no m/r/g, abdomen soft, NT, +BS, no scars, hernias or distention, no pulsatile masses or bruits appreciated, no CVA tenderness, no peripheral edema or deformities, alert, clear speech and steady gait.

## 2023-06-29 NOTE — ED PROVIDER NOTE - NSFOLLOWUPINSTRUCTIONS_ED_ALL_ED_FT
Our Emergency Department Referral Coordinators will be reaching out to you in the next 24-48 hours from 9:00am to 5:00pm with a follow up appointment. Please expect a phone call from the hospital in that time frame. If you do not receive a call or if you have any questions or concerns, you can reach them at   [phone]      Abnormal Uterine Bleeding    Abnormal uterine bleeding can affect women at various stages in life, including teenagers, women in their reproductive years, pregnant women, and women who have reached menopause. Several kinds of uterine bleeding are considered abnormal, including:     Bleeding or spotting between periods.    Bleeding after sexual intercourse.    Bleeding that is heavier or more than normal.    Periods that last longer than usual.  Bleeding after menopause.      Many cases of abnormal uterine bleeding are minor and simple to treat, while others are more serious. Any type of abnormal bleeding should be evaluated by your health care provider. Treatment will depend on the cause of the bleeding.    HOME CARE INSTRUCTIONS  Monitor your condition for any changes. The following actions may help to alleviate any discomfort you are experiencing:    Avoid the use of tampons and douches as directed by your health care provider.   Change your pads frequently.     You should get regular pelvic exams and Pap tests. Keep all follow-up appointments for diagnostic tests as directed by your health care provider.     SEEK MEDICAL CARE IF:  Your bleeding lasts more than 1 week.    You feel dizzy at times.      SEEK IMMEDIATE MEDICAL CARE IF:  You pass out.    You are changing pads every 15 to 30 minutes.    You have abdominal pain.   You have a fever.    You become sweaty or weak.    You are passing large blood clots from the vagina.    You start to feel nauseous and vomit.     MAKE SURE YOU:  Understand these instructions.  Will watch your condition.  Will get help right away if you are not doing well or get worse.    ADDITIONAL NOTES AND INSTRUCTIONS    Please follow up with your Primary MD in 24-48 hr.  Seek immediate medical care for any new/worsening signs or symptoms.

## 2023-06-29 NOTE — ED ADULT TRIAGE NOTE - CHIEF COMPLAINT QUOTE
pt currently on menstrual cycle started 6/27  but since last night has had to change her tampon quite frequently pt states periods are usually regular and heavy but the last period in may was light

## 2023-06-29 NOTE — ED ADULT NURSE NOTE - NSFALLUNIVINTERV_ED_ALL_ED
Bed/Stretcher in lowest position, wheels locked, appropriate side rails in place/Call bell, personal items and telephone in reach/Instruct patient to call for assistance before getting out of bed/chair/stretcher/Non-slip footwear applied when patient is off stretcher/[place] to call system/Physically safe environment - no spills, clutter or unnecessary equipment/Purposeful proactive rounding/Room/bathroom lighting operational, light cord in reach

## 2023-06-29 NOTE — ED PROVIDER NOTE - CLINICAL SUMMARY MEDICAL DECISION MAKING FREE TEXT BOX
Patient presents with vaginal bleeding / DUB likely secondary to non-emergent cause of abnormal uterine bleeding such as anovulatory cycle. Based on History, Exam, and ED Workup patient’s presentation not consistent with ectopic pregnancy, molar pregnancy, life-threatening coagulopathy, trauma, serious bacterial infection. Patient will follow up with OBGYN for bx and further eval and management. Return precautions discussed.

## 2023-06-29 NOTE — ED ADULT NURSE NOTE - DRUG PRE-SCREENING (DAST -1)
CONTROLLED MEDICATION REFILL REQUEST    STATE REGULATION APPT EVERY 3 MONTHS    UDS(URINE DRUG SCREEN) EVERY 6 MONTHS    NEW NARC CONSENT EVERY YEAR     URINE DRUG SCREEN: POC Urine Drug Screen [facility] (01/10/2022 11:31)    LAST OFFICE VISIT: Office Visit with Dixon Grace MD (04/13/2022)    NARC CONSENT: CONTROLLED SUBSTANCE AGREEMENT - SCAN - 1/10/22 NARCOTIC CONSENT/ IMPE (01/10/2022)    NEXT OFFICE VISIT: Appointment with Dixno Grace MD (07/11/2022)    MEDICATION: dexmethylphenidate XR (FOCALIN XR) 15 MG 24 hr capsule (04/13/2022)   Statement Selected

## 2023-06-29 NOTE — ED PROVIDER NOTE - PROGRESS NOTE DETAILS
MS- Pelvic exam performed with Dr. Redding supervising, cervix visualized. Mild bleeding from cervical os. No discharge. No tenderness.   Patient informed regarding US results. Will follow-up with OB/Gyn in the office

## 2023-06-29 NOTE — ED PROVIDER NOTE - PHYSICAL EXAMINATION
Female
VITAL SIGNS: I have reviewed nursing notes and confirm.  CONSTITUTIONAL: well-appearing, non-toxic, NAD  SKIN: Warm dry, normal skin turgor  HEAD: NCAT  EYES: EOMI, PERRLA, no scleral icterus  ENT: Moist mucous membranes, normal pharynx with no erythema or exudates  NECK: Supple; non tender. Full ROM.   CARD: RRR, no murmurs, rubs or gallops  RESP: clear to ausculation b/l.  No rales, rhonchi, or wheezing.  ABD: soft, + BS, non-tender, non-distended, no rebound or guarding. No CVA tenderness  EXT: Full ROM, no bony tenderness, no pedal edema, no calf tenderness  NEURO: normal motor. normal sensory. Normal gait.  PSYCH: Cooperative, appropriate.
